# Patient Record
(demographics unavailable — no encounter records)

---

## 2024-10-26 NOTE — HISTORY OF PRESENT ILLNESS
[FreeTextEntry1] : The patient returns stating she is doing better, but still her foot gives her a hard time that she stated.  She states that the injections are helping.  She also states that the right foot is a lithe better.  and it does not hurt.

## 2024-10-26 NOTE — PHYSICAL EXAM
[TextEntry] : On the dorsal lateral aspect of the left foot near the base of the fifth metatarsal was diminished pain, swelling and temperature, although still present.

## 2024-10-26 NOTE — ASSESSMENT
[FreeTextEntry1] : Assessment: Peroneal tendinitis, left foot.  Plan:   After further discussion it was agreed to give the patient an injection to reduce inflammation and associated pain and to improve function.  The area to be injected was wiped thoroughly with sterile alcohol.  Then using a combination of 1.0 cc of Celestone acetate suspension combined with 1cc of Dexamethasone sodium phosphate and 1.0cc of Lidocaine plain, an injection was given on the dorsolateral aspect of the left foot.  Sterile gauze was used for compression and then a sterile Band-Aid was applied to the injection site. The possibility of a flair reaction was discussed with the patient prior to administering the injection.  A compression strapping was applied was applied the foot and ankle for support and to help reduce swelling and pain and improve function.  After further assessment the left foot was placed in approximately 90 degrees to the ankle with the foot in as close to neutral position as possible.  The foot and ankle were prepped to be clean and dry.  Pre tape spray may have been used on the foot and ankle as indicated.  Pre-tape adhesive spray provides an extra sticky, waterproof barrier, that helps tapes and strapping stick better to the skin. Perfect for oily skin, sweaty skin, water sports and extreme environments. It can be a game-changer when strapping challenging areas such as sweaty or wet ankles & feet. I then applied a strapping to the left foot and ankle for support and compression to help relieve pressure and symptoms related to the patient's foot/ankle problem.   The strapping can aid recovery by supporting the muscles, tendons, or ligaments around the affected painful area, as well as improving blood flow to the area. Plus, the strapping will reduce pain and improve the gait, preventing further injury from poor alignment. The strapping also provides proprioceptive feedback. The strapping can reduce the amount of stretching and moving the ligaments do when the patient is weight bearing. This not only gives tissues a better chance to heal, but it also helps prevent further damage. This strapping also minimizes foot pronation, collapse, or flattening which causes over use and irritation to the muscle, tendons, and ligaments in the foot. Often, relief of pain with the strapping is a diagnostic indication for the need of functional orthotic devices. In general, strappings may be used to treat strains, sprains, dislocations, and some fractures. Strapping the foot and ankle provides the external stabilization that stretched ligaments (tissues connecting bone to bone) need while they heal. Most studies show that strapping the foot and ankle decreases the incidence and severity of the affected area.  Besides physical support, strapping can increase biofeedback and increase proprioception, which is a body's ability to know where it is thus aiding in healing and reducing further exacerbation of the pain. The patient was instructed to leave the strapping in place for the next few days to a week, if it was comfortable.  The patient was advised to keep the strapping dry, but leave it on and dry with a hair dryer if it got wet.   The patient was further instructed that if the strapping was uncomfortable or causes any problems it should be removed right away and the office notified.  PTR:  1 month.

## 2024-10-28 NOTE — HISTORY OF PRESENT ILLNESS
[FreeTextEntry1] : Ms. Parsons is a 54 y/o female with PMH of vasovagal syncope (s/p ILR), migraines, HLD, cervical spine fracture. Patient has had a longstanding history of fainting which begun when she was a teenager.

## 2024-10-28 NOTE — CARDIOLOGY SUMMARY
[de-identified] : 8/23/23 TTE: LVEF 61%, moderately increased wall thickness, normal RV function, mild TR

## 2024-11-08 NOTE — CARDIOLOGY SUMMARY
[de-identified] : 11/8/2024 - NSR 68BPM, normal intervals [de-identified] : 8/23/23 TTE: LVEF 61%, moderately increased wall thickness, normal RV function, mild TR

## 2024-11-08 NOTE — CARDIOLOGY SUMMARY
[de-identified] : 11/8/2024 - NSR 68BPM, normal intervals [de-identified] : 8/23/23 TTE: LVEF 61%, moderately increased wall thickness, normal RV function, mild TR

## 2024-11-08 NOTE — HISTORY OF PRESENT ILLNESS
[FreeTextEntry1] : Ms. Parsons is a 54 y/o female with PMH of vasovagal syncope (s/p ILR), migraines, HLD, cervical spine fracture. Patient has had a longstanding history of fainting which begun when she was a teenager.  She underwent a loop recorder implantation in January 2024 ([employer]).  Since her implantation, she states she has not had any recurrent episodes of syncope.  Device prior interrogations reviewed, no evidence of adrian or tachyarrhythmia.  Device interrogated in the office today, again showing no adrian or tachyarrhythmias. She has been otherwise doing well.  She is somewhat bothered by the loop recorder as she feels it when she works out, but is not prohibitive.

## 2024-11-08 NOTE — HISTORY OF PRESENT ILLNESS
[FreeTextEntry1] : Ms. Parsons is a 56 y/o female with PMH of vasovagal syncope (s/p ILR), migraines, HLD, cervical spine fracture. Patient has had a longstanding history of fainting which begun when she was a teenager.  She underwent a loop recorder implantation in January 2024 ([employer]).  Since her implantation, she states she has not had any recurrent episodes of syncope.  Device prior interrogations reviewed, no evidence of adrian or tachyarrhythmia.  Device interrogated in the office today, again showing no adrian or tachyarrhythmias. She has been otherwise doing well.  She is somewhat bothered by the loop recorder as she feels it when she works out, but is not prohibitive.

## 2024-11-08 NOTE — ASSESSMENT
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  She has not had any tachy or bradyarrhythmias since her loop implant however she has not had any recurrent syncope events.  We will continue to monitor her remotely with an in office follow up in 6 months.  She did express that she would like the loop recorder moved after a formal diagnosis is made as it is bothering her.  All questions were answered to her apparent satisfaction.

## 2024-11-20 NOTE — ASSESSMENT
[FreeTextEntry1] : Assessment: Foreign body, glass, left foot.  Plan:   Utilizing a sterile #15 blade, I was able to excise small pieces of glass from both the submetatarsal 4 and left heel region.  I cleansed both areas with normal saline and applied Amerigel and a sterile dressing.  I applied dispersion to the submetatarsal 4 region.  I then told her to start soaking her foot in lukewarm water and Epsom salts, 2 tablespoons per pint for 20 minutes twice per day.  She was advised to dry her feet with a clean towel and then apply a sterile dressing to the area until symptoms and objective findings return to normal.  I advised the patient to notify the office right away if increased redness, swelling, pain, open wounds or discharge were observed.  PTR:  1 week.

## 2024-11-20 NOTE — HISTORY OF PRESENT ILLNESS
[FreeTextEntry1] : Mary Parsons returns stating that she stepped on glass yesterday.  She states that they were working on a ceiling fan in her home and the glass portion fell and broke.  She states that her  tried to remove a piece yesterday, but she does not think that it got all the way out and the foot is hurting her.  She denies any signs of infection.

## 2024-11-20 NOTE — PHYSICAL EXAM
[TextEntry] : On the plantar surface of the left foot plantar to the fourth metatarsal head and the left heel, there are areas of blackened skin.  They are painful to palpation.  No pus, discharge, or erythema is present.

## 2024-11-27 NOTE — HISTORY OF PRESENT ILLNESS
[Right Leg] : right leg [Work related] : work related [Sudden] : sudden [10] : 10 [Radiating] : radiating [Tightness] : tightness [Intermittent] : intermittent [Household chores] : household chores [Meds] : meds [Nothing helps with pain getting better] : Nothing helps with pain getting better [Sitting] : sitting [Standing] : standing [Walking] : walking [Exercising] : exercising [Disabled] : Work status: disabled [de-identified] : Patient Complaint - WC 3/10/2006 R Hip s/p arthroscopy (Dr. Ray) Better w/ ART therapy and going to the gym, hasn't been able to go recently due to her mother being i take Vicodin which is helpful. 7/14/10: Pt still notes pain in her buttocks, groin and greater trochanteric region. Had u/s guided injecti and buttock w/ early relief 11/10/10 Awaiting auth for repeat injection 1/5/11 had trigger injections x 2 w/ good releif. ART 3/2/11: pt presents with cont pain in R hip, no significant changes. Pt cont to use Vicodin. 5/11/11: Pt presents f/u R hip pain, still doing ART which is helpful. Pt still using Vicodin. 7/13/11 just had r glut injection and is feeling better, but still c/o pain. Getting ART. 10/19/11: cont to note pain, ART is still helpful. Vicodin PRN. 3/7/12: f/u R hip pain. Still awaiting authorization for R hip injection (via Dr. Ray). 6/6/12: f/u R hip, notes cont pain in lateral thigh and groin region, especially after working. Had injectio 8/8/12 f/u R hip ART post-poned but restarted. Has f/u w/ Dr. Ray and new MRI 10/3/12 cont P.T. considering hip injection 12/5/12: f/u R hip, completed MRI of R hip, notes cont pain. 3/13/13 f/u R hip. Had f/u w/ Dr. Ray, awaitting auth for injection. 5/8/13: f/u R hip, notes it gave out , causing her to stumble, no increase in pain. Doing ART still. 7/3/13: f/u R hip, notes cont pain. Doing ART, which is helpful. Requesting Vicodin. F/u Dr. Ray 8/21/13: f/u R hip. She continues to have pain. ART helps. 10/02/13: Pt here for f/u right hip. stabel, good and bad reynaga. doing ART and going to gym. requesting re 11/13/13 here for follow up , is scheduled to see Dr Rodríguez for consult, still going to gym and doing AR 1/15/14: here for f/u right hip. continued pain. waiting to see Dr. Rodríguez. doing ART with good relief. 3/12/14: F/u R hip. Doing ART 5/14/14 F/ R hi Di ART Vidi  f i Sh i till iti t  D Rt t HSS 8/20/14 f/u R hip w/clicking 10/1/14 f/u R hip scheduled first of 3 injections 10/10 ART Requ Vicodin r/n 12/10/14: f/u R hip. stable. better after injections. ART. requesting vicodin. 2/4/15 f/u R hip HEP. Caring for family member 4/1/15 f/u R hip hep, ART occas popping 5/27/15: f/u R hip lower back. Intermittent pain. ART with good relief. 7/8/15 f/u R hip PT reauthorized, still going to ART. 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 Had PT/ART yesterday, u patch 10/21/15: f/u R hip. Here to discuss mri results. Continued pain. 12/2/15 f/u R hip HEP. PT not authorized 2/24/16 f/u R hip HEP.. Was better w/ART, aqua 6/8/16 f/u R hip and radiating lbp. Has been caring for mother, unable to get to regular treatments Working l 8/3/16 f/u R hip. Increasing pain requesting injection. Working light duty 9/14/16 f/u R hip Back pain better after last injection ART, light duty 11/9/16: f/u R hip, continued lateral hip pain, doing ART/HEP and going to the gym frequently. Working light  1/25/17: f/u R hip. continued pain. Doing ART. stable with medication. 3/15/17 f/u R hip HEP/gym. Working light duty 5/31/17 f/u L hip/vback. PT/ART no authorized HEP Working light duty 7/5/17: f/u L hip/back. waiting on authorization for heating pad. 9/13/17 f/u R hip HEP/ART Not working Med r/n 12/13/17 f/u R hip/back. HEP/PT ART. Working light duty 1/24/18 f/u R hip/back. PT/ART working light duty. Med request 4/11/18: f/u R hip/back. Continued pain lower back, right hip radiating down right leg. working light duty. 5/30/18 f/u back ,leg Better after last injection 8/29/18: f/u hip and back. continued pain. has concussion and has bad headache. working light duty 11/14/18: f/u hip and back. continued pain. working light duty. 1/9/18 f/u R hip HEP working light duty 3/13/19 f/u R hip Still clicks Not working due to unrelated neck/arm condition Was better w/ ART 5/1/19 f/u R hip. PT/ART on hold due to elbow condition Not working 6/12/19: f/u R hip. PT/ART when she can afford to go. She now c/o left hip pain from an altered gait. 7/31/19: f/u R hip Unable to get to gym/ART Not working 9/25/19 f/u R hip, "pops" No active treatment due to other conditions Not working 11/6/19 f/u R HIP Flector patch helpful but not authorized. Pain mgt 12/18/19: f/u R hip. Continued pain. waiting for auth for flector patch. Pt did not get any relief from Lidod topicals. 2/12/20 f/u R hip PT not auth, HEP 5/6/20: f/u R hip, continued pain to the hip. HEP. Taking diclofenac/Vicodin prn. disabled 7/7/20 f/u R hip/back. Flector patch auth pending Requesting injection 9/23/2020: f/u R hip/back. Continue pain to her back radiating down her R leg. Waiting for auth for mri lum 10/14/20 f/u R hip and back pain 1/27/21: f/u R hip and back pain. Continued hip pain. 3/24/21 f/u R hip clicking and R sided SI pain. HEP Not working due to arm and neck condition 6/23/21 f/u R hip click and r sided lbp HEP Not working 8/25/21 f/u R sided lbp and R hip "clicking" HEP Not working 10/27/21 f/u R hip and R sided lbp HEP Not working (neck and elbow unrelated) 1/5/22 f/u R lbp, R hip radiating to groin 2/23/22 F/U r BACK/HIP RADIATING PAIN. lAST INJECTION HELPED GLUTEAL PAIN HEP/Gym Not working R 5/4/22  f/u lbp/R hip  Voltaren Gel/Vicodin  ? muscle relaxant  Not working 6/8/22  f/u R hip, knee, low back, and R ankle  HEP/Gym 8/3/22  f/u R hip R knee R ankle and low back.  HEP Requ Voltaren Gel and Vicodin  Not working 10/26/22  f/u R hip/knee/back/ankle  HEP/Gym  Med r/n 2/22/23: f/u R hip/knee/back/ankle.   hep/gym.   med r/n 3/29/23  f/u R hip/knee/back/ankle R buttock to groin radiation HEP  Pain MGT  Massage therapy 6/14/23  f/u R hip/back/knee/ankle  Pain Mgt  Gym program  Major issues with pain mgt 9/20/23  f/u R hip/back HEP  [facility] request  Not working 12/13/23  f/u R hip/back  HEP/Gym beneficial  Not working 3/13/24  f/u R hip/back  Recent appendectomy  Restarted Gym 5/22/24  f/u R hip/back.  Radiating R hip/buttovk/groin pain Med auth requested (x2)  Gym helpful 7/24/24  f/u R hip/back  Med r/n  HEP/Gym  Not working 9/25/24  f/u R hip and back.  Having issues with Pain Rx timing 11/27/24: f/u R hip and back. better w/ gym   [] : no [FreeTextEntry1] : Rt Hip [FreeTextEntry3] : 3-10-06 [FreeTextEntry7] : lower back [FreeTextEntry9] : gym exercises [de-identified] : over use [de-identified] : not working

## 2024-11-27 NOTE — PHYSICAL EXAM
[NL (90)] : forward flexion 90 degrees [NL (30)] : extension 30 degrees [NL (35)] : adduction 35 degrees [NL (45)] : abduction 45 degrees [5___] : adduction 5[unfilled]/5 [Right] : right knee [NL (0)] : extension 0 degrees [FreeTextEntry9] : audible/palpable "pop" [] : no extensor lag [TWNoteComboBox7] : flexion 115 degrees

## 2024-11-27 NOTE — REASON FOR VISIT
Linear
[FreeTextEntry2] : F/U WC Rt Hip 6/04\par  WC 5/1/05 Low back, R knee, R hip, R ankle\par  3/10/06 Low back, R knee, R hip, R ankle

## 2024-11-27 NOTE — DISCUSSION/SUMMARY
[de-identified] : Continue HEP/Gym  Massage therapy  Continue medications as prescribed Pain Management

## 2024-11-29 NOTE — PHYSICAL EXAM
[TextEntry] : Greatly diminished pain on the posterior lateral aspect of the left foot and on the heel of the right foot.    Pain was no longer elicited upon palpation at this time with diminished temperature on the dorsal lateral aspect of the left foot    The right hallux was in valgus rotation with mild hyperkeratotic tissue present.

## 2024-11-29 NOTE — HISTORY OF PRESENT ILLNESS
[FreeTextEntry1] : The patient returns stating that the pain is 7/10 and was bad at 10/10.   She said she has it on both sides of both heels.    She also complains of a crease in the right great toe with some build up there.    She admits that she walks barefoot in the house.

## 2024-11-29 NOTE — ASSESSMENT
[FreeTextEntry1] : Assessment: Peroneal tendinitis, left foot. Plantar fasciitis, bilateral. Hallux valgus, right foot.  Plan:   I performed aseptic scalpel debridement of the one painful lesion on the right great toe with a sterile #15 blade on a sterile #3 handle.  The patient was given stretching exercises to be performed for the Achilles tendon and plantar fascial regions of both feet for the heel pain.  The patient was instructed to perform the exercises gradually at first and then more aggressively twice per day, for 2 minutes per exercise. The patient was advised to gradually increase weight bearing activities for the next 2-3 weeks.  The patient was instructed as to proper shoe gear.  I recommended a brand name such as New Balance, Avia, Saucony or Brasher and advised patient to get an aerobic, cross  or running shoe. I explained that the heel counter should be rigid, the shoe/sneaker should not bend easily or be flimsy and should only bend by the ball of the foot.  The sides of the sneakers or shoes should be rigid especially in the rear foot area.  The patient was advised not to walk barefoot, wear slippers, flip-flops or open back shoes.   She is to call with any problems or questions and  return in six weeks.

## 2025-01-16 NOTE — PHYSICAL EXAM
[TextEntry] : On the plantar aspect of the left heel was increased temperature and pain.   Limited dorsiflexion was noted on the left foot compared to the right.    On the dorsolateral aspect of the left foot, swelling, pain and temperature all greatly diminished.   The right first and second toes exhibited peeling skin, mild maceration.    The second toe was contracted.

## 2025-01-16 NOTE — HISTORY OF PRESENT ILLNESS
[FreeTextEntry1] : The patient returns for continued treatment for the pain in her left heel.   She states that last night the upper arch hurt on the left foot and the pain was 10/10.  She states that it does not happen all the time.    Right now, she states that the pain is 4/10.   Right foot, she stated that she used the medicated corn pad between the big toe and the second toe because there was a corn there and now she has peeling skin.

## 2025-01-16 NOTE — ASSESSMENT
[FreeTextEntry1] : Assessment: Plantar fasciitis, left foot. Peroneal tendinitis, left foot. Hammertoe deformity, left second toe.   Plan: The patient was given a prescription for Meloxicam 15-mg, to treat the inflammation and reduce pain & swelling and help restore normal function. The patient was instructed to take 1 tablet per day after her major meal for 2 weeks, so that it would be less offensive on the stomach.  The patient denied being allergic to the medication and was advised to call the office if they experienced any side effects.  The patient was advised strongly not to use the medicated corn pads.  The patient was dispensed a gel toecap for the right hallux to wear at all times other than bathing and sleeping, to help straighten the toe somewhat and cushion it and separate it from the adjacent toe to relieve pressure and reduce and help prevent pain and inflammation.   The patient was advised to wear shoes with a high toe box and a comfortable width to reduce pressure on the toes.  She is to call with any problems or questions and  return in two weeks.

## 2025-01-29 NOTE — REASON FOR VISIT
[FreeTextEntry2] : F/U WC Rt Hip 6/04\par  WC 5/1/05 Low back, R knee, R hip, R ankle\par  3/10/06 Low back, R knee, R hip, R ankle

## 2025-01-29 NOTE — HISTORY OF PRESENT ILLNESS
[Right Leg] : right leg [Work related] : work related [Sudden] : sudden [Radiating] : radiating [Tightness] : tightness [Intermittent] : intermittent [Household chores] : household chores [Meds] : meds [Nothing helps with pain getting better] : Nothing helps with pain getting better [Sitting] : sitting [Standing] : standing [Walking] : walking [Exercising] : exercising [Disabled] : Work status: disabled [8] : 8 [de-identified] : Patient Complaint - WC 3/10/2006 R Hip s/p arthroscopy (Dr. Ray) Better w/ ART therapy and going to the gym, hasn't been able to go recently due to her mother being i take Vicodin which is helpful. 7/14/10: Pt still notes pain in her buttocks, groin and greater trochanteric region. Had u/s guided injecti and buttock w/ early relief 11/10/10 Awaiting auth for repeat injection 1/5/11 had trigger injections x 2 w/ good releif. ART 3/2/11: pt presents with cont pain in R hip, no significant changes. Pt cont to use Vicodin. 5/11/11: Pt presents f/u R hip pain, still doing ART which is helpful. Pt still using Vicodin. 7/13/11 just had r glut injection and is feeling better, but still c/o pain. Getting ART. 10/19/11: cont to note pain, ART is still helpful. Vicodin PRN. 3/7/12: f/u R hip pain. Still awaiting authorization for R hip injection (via Dr. Ray). 6/6/12: f/u R hip, notes cont pain in lateral thigh and groin region, especially after working. Had injectio 8/8/12 f/u R hip ART post-poned but restarted. Has f/u w/ Dr. Ray and new MRI 10/3/12 cont P.T. considering hip injection 12/5/12: f/u R hip, completed MRI of R hip, notes cont pain. 3/13/13 f/u R hip. Had f/u w/ Dr. Ray, awaitting auth for injection. 5/8/13: f/u R hip, notes it gave out , causing her to stumble, no increase in pain. Doing ART still. 7/3/13: f/u R hip, notes cont pain. Doing ART, which is helpful. Requesting Vicodin. F/u Dr. Ray 8/21/13: f/u R hip. She continues to have pain. ART helps. 10/02/13: Pt here for f/u right hip. stabel, good and bad reynaga. doing ART and going to gym. requesting re 11/13/13 here for follow up , is scheduled to see Dr Rodríguez for consult, still going to gym and doing AR 1/15/14: here for f/u right hip. continued pain. waiting to see Dr. Rodríguez. doing ART with good relief. 3/12/14: F/u R hip. Doing ART 5/14/14 F/ R hi Di ART Vidi  f i Sh i till iti t  D Rt t HSS 8/20/14 f/u R hip w/clicking 10/1/14 f/u R hip scheduled first of 3 injections 10/10 ART Requ Vicodin r/n 12/10/14: f/u R hip. stable. better after injections. ART. requesting vicodin. 2/4/15 f/u R hip HEP. Caring for family member 4/1/15 f/u R hip hep, ART occas popping 5/27/15: f/u R hip lower back. Intermittent pain. ART with good relief. 7/8/15 f/u R hip PT reauthorized, still going to ART. 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 Had PT/ART yesterday, u patch 10/21/15: f/u R hip. Here to discuss mri results. Continued pain. 12/2/15 f/u R hip HEP. PT not authorized 2/24/16 f/u R hip HEP.. Was better w/ART, aqua 6/8/16 f/u R hip and radiating lbp. Has been caring for mother, unable to get to regular treatments Working l 8/3/16 f/u R hip. Increasing pain requesting injection. Working light duty 9/14/16 f/u R hip Back pain better after last injection ART, light duty 11/9/16: f/u R hip, continued lateral hip pain, doing ART/HEP and going to the gym frequently. Working light  1/25/17: f/u R hip. continued pain. Doing ART. stable with medication. 3/15/17 f/u R hip HEP/gym. Working light duty 5/31/17 f/u L hip/vback. PT/ART no authorized HEP Working light duty 7/5/17: f/u L hip/back. waiting on authorization for heating pad. 9/13/17 f/u R hip HEP/ART Not working Med r/n 12/13/17 f/u R hip/back. HEP/PT ART. Working light duty 1/24/18 f/u R hip/back. PT/ART working light duty. Med request 4/11/18: f/u R hip/back. Continued pain lower back, right hip radiating down right leg. working light duty. 5/30/18 f/u back ,leg Better after last injection 8/29/18: f/u hip and back. continued pain. has concussion and has bad headache. working light duty 11/14/18: f/u hip and back. continued pain. working light duty. 1/9/18 f/u R hip HEP working light duty 3/13/19 f/u R hip Still clicks Not working due to unrelated neck/arm condition Was better w/ ART 5/1/19 f/u R hip. PT/ART on hold due to elbow condition Not working 6/12/19: f/u R hip. PT/ART when she can afford to go. She now c/o left hip pain from an altered gait. 7/31/19: f/u R hip Unable to get to gym/ART Not working 9/25/19 f/u R hip, "pops" No active treatment due to other conditions Not working 11/6/19 f/u R HIP Flector patch helpful but not authorized. Pain mgt 12/18/19: f/u R hip. Continued pain. waiting for auth for flector patch. Pt did not get any relief from Lidod topicals. 2/12/20 f/u R hip PT not auth, HEP 5/6/20: f/u R hip, continued pain to the hip. HEP. Taking diclofenac/Vicodin prn. disabled 7/7/20 f/u R hip/back. Flector patch auth pending Requesting injection 9/23/2020: f/u R hip/back. Continue pain to her back radiating down her R leg. Waiting for auth for mri lum 10/14/20 f/u R hip and back pain 1/27/21: f/u R hip and back pain. Continued hip pain. 3/24/21 f/u R hip clicking and R sided SI pain. HEP Not working due to arm and neck condition 6/23/21 f/u R hip click and r sided lbp HEP Not working 8/25/21 f/u R sided lbp and R hip "clicking" HEP Not working 10/27/21 f/u R hip and R sided lbp HEP Not working (neck and elbow unrelated) 1/5/22 f/u R lbp, R hip radiating to groin 2/23/22 F/U r BACK/HIP RADIATING PAIN. lAST INJECTION HELPED GLUTEAL PAIN HEP/Gym Not working R 5/4/22  f/u lbp/R hip  Voltaren Gel/Vicodin  ? muscle relaxant  Not working 6/8/22  f/u R hip, knee, low back, and R ankle  HEP/Gym 8/3/22  f/u R hip R knee R ankle and low back.  HEP Requ Voltaren Gel and Vicodin  Not working 10/26/22  f/u R hip/knee/back/ankle  HEP/Gym  Med r/n 2/22/23: f/u R hip/knee/back/ankle.   hep/gym.   med r/n 3/29/23  f/u R hip/knee/back/ankle R buttock to groin radiation HEP  Pain MGT  Massage therapy 6/14/23  f/u R hip/back/knee/ankle  Pain Mgt  Gym program  Major issues with pain mgt 9/20/23  f/u R hip/back HEP  MNeds request  Not working 12/13/23  f/u R hip/back  HEP/Gym beneficial  Not working 3/13/24  f/u R hip/back  Recent appendectomy  Restarted Gym 5/22/24  f/u R hip/back.  Radiating R hip/buttovk/groin pain Med auth requested (x2)  Gym helpful 7/24/24  f/u R hip/back  Med r/n  HEP/Gym  Not working 9/25/24  f/u R hip and back.  Having issues with Pain Rx timing 11/27/24: f/u R hip and back. better w/ gym   1/29/25  f/u R hip and lbp.  Better w/ gym and PT [] : no [FreeTextEntry1] : Rt Hip [FreeTextEntry3] : 3-10-06 [FreeTextEntry7] : lower back [FreeTextEntry9] : gym exercises [de-identified] : over use [de-identified] : not working

## 2025-01-29 NOTE — PHYSICAL EXAM
[NL (90)] : forward flexion 90 degrees [Right] : right hip [NL (30)] : extension 30 degrees [NL (35)] : adduction 35 degrees [NL (45)] : abduction 45 degrees [5___] : adduction 5[unfilled]/5 [NL (0)] : extension 0 degrees [FreeTextEntry9] : audible/palpable "pop" [] : no extensor lag [TWNoteComboBox7] : flexion 115 degrees

## 2025-01-29 NOTE — DISCUSSION/SUMMARY
[de-identified] : Continue HEP Request auth for PT  Continue medications as prescribed Pain Management

## 2025-02-06 NOTE — HISTORY OF PRESENT ILLNESS
[FreeTextEntry1] : The patient returns stating that she is improving.  When questioned she stated that She is doing the stretching.    I questioned if she took the meloxicam, and she stated that her gastroenterologist told her not to take it because she has acid reflux.

## 2025-02-06 NOTE — PHYSICAL EXAM
[TextEntry] : The plantar aspect of the left heel was not as warm to the touch.   It was not as painful upon palpation.   There was improved dorsiflexion.

## 2025-02-06 NOTE — ASSESSMENT
[FreeTextEntry1] : Assessment: Plantar fasciitis, left foot.  Plan:   After further discussion it was agreed to give the patient an injection to reduce inflammation and associated pain and to improve function.  The area to be injected was wiped thoroughly with sterile alcohol.  Then using a combination of 1.0 cc of Celestone acetate suspension combined with 1cc of Dexamethasone sodium phosphate and 1.0cc of Lidocaine plain, an injection was given into the plantar medial aspect of the left heel.  Sterile gauze was used for compression and then a sterile Band-Aid was applied to the injection site. The possibility of a flair reaction was discussed with the patient prior to administering the injection.  After further assessment the left foot and ankle was placed in approximately 90 degrees to the ankle with the foot in as close to neutral position as possible.  The foot and ankle were prepped to be clean and dry.  Pre tape spray may have been used on the foot and ankle as indicated.  Pre-tape adhesive spray provides an extra sticky, waterproof barrier, that helps tapes and strapping stick better to the skin. Perfect for oily skin, sweaty skin, water sports and extreme environments. It can be a game-changer when strapping challenging areas such as sweaty or wet ankles & feet. I then applied a strapping to the left foot and ankle for support and compression to help relieve pressure and symptoms related to the patient's foot/ankle problem.   The strapping can aid recovery by supporting the muscles, tendons, or ligaments around the affected painful area, as well as improving blood flow to the area. Plus, the strapping will reduce pain and improve the gait, preventing further injury from poor alignment. The strapping also provides proprioceptive feedback. The strapping can reduce the amount of stretching and moving the ligaments do when the patient is weight bearing. This not only gives tissues a better chance to heal, but it also helps prevent further damage. This strapping also minimizes foot pronation, collapse, or flattening which causes over use and irritation to the muscle, tendons, and ligaments in the foot. Often, relief of pain with the strapping is a diagnostic indication for the need of functional orthotic devices. In general, strappings may be used to treat strains, sprains, dislocations, and some fractures. Strapping the foot and ankle provides the external stabilization that stretched ligaments (tissues connecting bone to bone) need while they heal. Most studies show that strapping the foot and ankle decreases the incidence and severity of the affected area.  Besides physical support, strapping can increase biofeedback and increase proprioception, which is a body's ability to know where it is thus aiding in healing and reducing further exacerbation of the pain. The patient was instructed to leave the strapping in place for the next few days to a week, if it was comfortable.  The patient was advised to keep the strapping dry, but leave it on and dry with a hair dryer if it got wet.   The patient was further instructed that if the strapping was uncomfortable or causes any problems it should be removed right away and the office notified.  The blister on the right second toe has resolved.    She is to continue to stretch twice a day, limit weightbearing activities for the next 2 weeks, which she did not do.   In fact, she was on the treadmill, but she was not running, she was walking.   I explained the importance of rest and she is to  return in two weeks.

## 2025-03-26 NOTE — HISTORY OF PRESENT ILLNESS
[Right Leg] : right leg [Work related] : work related [Sudden] : sudden [8] : 8 [Radiating] : radiating [Tightness] : tightness [Intermittent] : intermittent [Household chores] : household chores [Meds] : meds [Nothing helps with pain getting better] : Nothing helps with pain getting better [Sitting] : sitting [Standing] : standing [Walking] : walking [Exercising] : exercising [Disabled] : Work status: disabled [de-identified] : Patient Complaint - WC 3/10/2006 R Hip s/p arthroscopy (Dr. Ray) Better w/ ART therapy and going to the gym, hasn't been able to go recently due to her mother being i take Vicodin which is helpful. 7/14/10: Pt still notes pain in her buttocks, groin and greater trochanteric region. Had u/s guided injecti and buttock w/ early relief 11/10/10 Awaiting auth for repeat injection 1/5/11 had trigger injections x 2 w/ good releif. ART 3/2/11: pt presents with cont pain in R hip, no significant changes. Pt cont to use Vicodin. 5/11/11: Pt presents f/u R hip pain, still doing ART which is helpful. Pt still using Vicodin. 7/13/11 just had r glut injection and is feeling better, but still c/o pain. Getting ART. 10/19/11: cont to note pain, ART is still helpful. Vicodin PRN. 3/7/12: f/u R hip pain. Still awaiting authorization for R hip injection (via Dr. Ray). 6/6/12: f/u R hip, notes cont pain in lateral thigh and groin region, especially after working. Had injectio 8/8/12 f/u R hip ART post-poned but restarted. Has f/u w/ Dr. Ray and new MRI 10/3/12 cont P.T. considering hip injection 12/5/12: f/u R hip, completed MRI of R hip, notes cont pain. 3/13/13 f/u R hip. Had f/u w/ Dr. Ray, awaitting auth for injection. 5/8/13: f/u R hip, notes it gave out , causing her to stumble, no increase in pain. Doing ART still. 7/3/13: f/u R hip, notes cont pain. Doing ART, which is helpful. Requesting Vicodin. F/u Dr. Ray 8/21/13: f/u R hip. She continues to have pain. ART helps. 10/02/13: Pt here for f/u right hip. stabel, good and bad reynaga. doing ART and going to gym. requesting re 11/13/13 here for follow up , is scheduled to see Dr Rodríguez for consult, still going to gym and doing AR 1/15/14: here for f/u right hip. continued pain. waiting to see Dr. Rodríguez. doing ART with good relief. 3/12/14: F/u R hip. Doing ART 5/14/14 F/ R hi Di ART Vidi  f i Sh i till iti t  D Rt t HSS 8/20/14 f/u R hip w/clicking 10/1/14 f/u R hip scheduled first of 3 injections 10/10 ART Requ Vicodin r/n 12/10/14: f/u R hip. stable. better after injections. ART. requesting vicodin. 2/4/15 f/u R hip HEP. Caring for family member 4/1/15 f/u R hip hep, ART occas popping 5/27/15: f/u R hip lower back. Intermittent pain. ART with good relief. 7/8/15 f/u R hip PT reauthorized, still going to ART. 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 Had PT/ART yesterday, u patch 10/21/15: f/u R hip. Here to discuss mri results. Continued pain. 12/2/15 f/u R hip HEP. PT not authorized 2/24/16 f/u R hip HEP.. Was better w/ART, aqua 6/8/16 f/u R hip and radiating lbp. Has been caring for mother, unable to get to regular treatments Working l 8/3/16 f/u R hip. Increasing pain requesting injection. Working light duty 9/14/16 f/u R hip Back pain better after last injection ART, light duty 11/9/16: f/u R hip, continued lateral hip pain, doing ART/HEP and going to the gym frequently. Working light  1/25/17: f/u R hip. continued pain. Doing ART. stable with medication. 3/15/17 f/u R hip HEP/gym. Working light duty 5/31/17 f/u L hip/vback. PT/ART no authorized HEP Working light duty 7/5/17: f/u L hip/back. waiting on authorization for heating pad. 9/13/17 f/u R hip HEP/ART Not working Med r/n 12/13/17 f/u R hip/back. HEP/PT ART. Working light duty 1/24/18 f/u R hip/back. PT/ART working light duty. Med request 4/11/18: f/u R hip/back. Continued pain lower back, right hip radiating down right leg. working light duty. 5/30/18 f/u back ,leg Better after last injection 8/29/18: f/u hip and back. continued pain. has concussion and has bad headache. working light duty 11/14/18: f/u hip and back. continued pain. working light duty. 1/9/18 f/u R hip HEP working light duty 3/13/19 f/u R hip Still clicks Not working due to unrelated neck/arm condition Was better w/ ART 5/1/19 f/u R hip. PT/ART on hold due to elbow condition Not working 6/12/19: f/u R hip. PT/ART when she can afford to go. She now c/o left hip pain from an altered gait. 7/31/19: f/u R hip Unable to get to gym/ART Not working 9/25/19 f/u R hip, "pops" No active treatment due to other conditions Not working 11/6/19 f/u R HIP Flector patch helpful but not authorized. Pain mgt 12/18/19: f/u R hip. Continued pain. waiting for auth for flector patch. Pt did not get any relief from Lidod topicals. 2/12/20 f/u R hip PT not auth, HEP 5/6/20: f/u R hip, continued pain to the hip. HEP. Taking diclofenac/Vicodin prn. disabled 7/7/20 f/u R hip/back. Flector patch auth pending Requesting injection 9/23/2020: f/u R hip/back. Continue pain to her back radiating down her R leg. Waiting for auth for mri lum 10/14/20 f/u R hip and back pain 1/27/21: f/u R hip and back pain. Continued hip pain. 3/24/21 f/u R hip clicking and R sided SI pain. HEP Not working due to arm and neck condition 6/23/21 f/u R hip click and r sided lbp HEP Not working 8/25/21 f/u R sided lbp and R hip "clicking" HEP Not working 10/27/21 f/u R hip and R sided lbp HEP Not working (neck and elbow unrelated) 1/5/22 f/u R lbp, R hip radiating to groin 2/23/22 F/U r BACK/HIP RADIATING PAIN. lAST INJECTION HELPED GLUTEAL PAIN HEP/Gym Not working R 5/4/22  f/u lbp/R hip  Voltaren Gel/Vicodin  ? muscle relaxant  Not working 6/8/22  f/u R hip, knee, low back, and R ankle  HEP/Gym 8/3/22  f/u R hip R knee R ankle and low back.  HEP Requ Voltaren Gel and Vicodin  Not working 10/26/22  f/u R hip/knee/back/ankle  HEP/Gym  Med r/n 2/22/23: f/u R hip/knee/back/ankle.   hep/gym.   med r/n 3/29/23  f/u R hip/knee/back/ankle R buttock to groin radiation HEP  Pain MGT  Massage therapy 6/14/23  f/u R hip/back/knee/ankle  Pain Mgt  Gym program  Major issues with pain mgt 9/20/23  f/u R hip/back HEP  MNeds request  Not working 12/13/23  f/u R hip/back  HEP/Gym beneficial  Not working 3/13/24  f/u R hip/back  Recent appendectomy  Restarted Gym 5/22/24  f/u R hip/back.  Radiating R hip/buttovk/groin pain Med auth requested (x2)  Gym helpful 7/24/24  f/u R hip/back  Med r/n  HEP/Gym  Not working 9/25/24  f/u R hip and back.  Having issues with Pain Rx timing 11/27/24: f/u R hip and back. better w/ gym   1/29/25  f/u R hip and lbp.  Better w/ gym and PT 3/26/25  f/u R hip and lumbar spine.  Home/Gym  PT auth pending  Requ Meds [] : no [FreeTextEntry1] : Rt Hip [FreeTextEntry3] : 3-10-06 [FreeTextEntry7] : lower back [FreeTextEntry9] : gym exercises [de-identified] : over use [de-identified] : not working

## 2025-04-01 NOTE — HISTORY OF PRESENT ILLNESS
[Sudden] : sudden [Localized] : localized [de-identified] : Patient states she had the optimum service man at her house when he left the drill facing up with the drillbit. She squatted down and the drillbit went into her left thigh on 3/26/25. She went to [facility] and was treated with a tetanus shot and Cephalexin. Then a few days later she was seen at  and treated with Doxcycline.  [] : no [FreeTextEntry3] : 3-26-25 [FreeTextEntry5] : pt was up and downstairs 4 times to clear out a closet and  left drill on the floor  [de-identified] : over use [de-identified] : JERAD Carmen [de-identified] : darby Carmen

## 2025-04-01 NOTE — DISCUSSION/SUMMARY
[de-identified] : General Dx Discussion The patient was advised of the diagnosis. The natural history of the pathology was explained in full to the patient in layman's terms. All questions were answered. The risks and benefits of surgical and non-surgical treatment alternatives were explained in full to the patient.  Case Discussed. Advised to continue with antibiotics.  Wound Care Discussed. Will get an mri for further evaluation. f/u after mri.   Entered by Linda DEMARCO acting as a scribe. Instructions: Dr. Johnson- The documentation recorded by the scribe accurately reflects the service I personally performed and the decisions made by me.

## 2025-04-01 NOTE — DISCUSSION/SUMMARY
[de-identified] : General Dx Discussion The patient was advised of the diagnosis. The natural history of the pathology was explained in full to the patient in layman's terms. All questions were answered. The risks and benefits of surgical and non-surgical treatment alternatives were explained in full to the patient.  Case Discussed. Advised to continue with antibiotics.  Wound Care Discussed. Will get an mri for further evaluation. f/u after mri.   Entered by Linda DEMARCO acting as a scribe. Instructions: Dr. Johnson- The documentation recorded by the scribe accurately reflects the service I personally performed and the decisions made by me.

## 2025-04-01 NOTE — PHYSICAL EXAM
[Left] : left hip [FreeTextEntry3] : puncture wound proximal mid thigh. no active drainage. no signs of infection.

## 2025-04-01 NOTE — HISTORY OF PRESENT ILLNESS
[Sudden] : sudden [Localized] : localized [de-identified] : Patient states she had the optimum service man at her house when he left the drill facing up with the drillbit. She squatted down and the drillbit went into her left thigh on 3/26/25. She went to [facility] and was treated with a tetanus shot and Cephalexin. Then a few days later she was seen at  and treated with Doxcycline.  [] : no [FreeTextEntry3] : 3-26-25 [FreeTextEntry5] : pt was up and downstairs 4 times to clear out a closet and  left drill on the floor  [de-identified] : over use [de-identified] : JERAD Carmen [de-identified] : darby Carmen

## 2025-04-07 NOTE — DISCUSSION/SUMMARY
[de-identified] : The patient was advised of the diagnosis.  The natural history of the pathology was explained in full to the patient in layman's terms. All questions were answered.  The risks and benefits of surgical and non-surgical treatment alternatives were explained in full to the patient.

## 2025-04-07 NOTE — ASSESSMENT
[FreeTextEntry1] : Previous doc: She has had pain for many years but not sig OA on Xray - had Labral surgery in 2006 but not sure by who or what was done - Alot of lateral pain but I am also concerned of sig groin pain on exam - I will start PT but get MRA to eval joint and labrum - we did discuss possible options of surgery and conservative tx and she is unlikely a candidate for HARRY at this time 3/29/21 Sh has sig pain mostly lateral and down the thigh - MRA show no sig OA dn some labral scaring but no discrete tear - has some any capsular opening as well that could be contributing - I feel she may benefit form labral debridement and capsular closure but 50/50 chance at best - suggest cont conservative tx if she can tolerate it - we will try bursa inj today 8/16/21: Still with pain - she is trying to avoid surgery at this point as the results are uncertain. She does not have enough OA for HARRY at this time. She may in the future need further hip intervention if pain or OA worsen however at this time recc continuing with conservative care. 9/19/22: Exam and XR unchanged but has persistant pain but is not ready for surgery at this point. Will follow up in 3 months or sooner if pain gets worse. 4/17/23: Lateral and groin pain. No change in XRs today. Has pain but not severe enough for surgery at this point. Cont with HEP and return for repeat XR in 6 months. 12/18/23: Pt with troch bursitis, has some any capsular opening, very mild OA- discussed not enough for HARRY. Pt is well informed and would like to try diagnostic [place] hip inj with Dr. Escobedo due to persistent lateral and groin pain. Does look like labral injury, poss capsular injury. Follow up after injection.  Not sure how to proceed may need repeat arthroscopy possible labral reconstruction versus HARRY but will reevaluate after injection 1/15/24: Pt with troch bursitis, very mild OA.  I do believe a lot of her pain is originating from the hip joint.  Some sig relief with injection but still with pain that is tolerable. Discussed at this point her only realistic options are labral reconstruction vs HARRY- however she has min OA and we are both in agreement to hold off on surgical intervention for now. Will cont to monitor every 3 months. Doing HEP with some relief. If condition worses will discuss HARRY in the future. F/up 3 months 4/15/24: Overall no change - has pain but tolerating with HEP, will cont with this for now.  Has episodes of pain and takes vicodin for this which she gets from another surgeon. 8/19/24: Continued symptoms. On vicodin from Dr. Correa. Will obtain new MRI to re eval hip to see if there has been any progression of her OA or labral tear. Has pain on treadmill but oftentimes it is worse at the end of day. F/up after MRI  4/7/25: MRI reviewed- findings discussed.  Had mild OA with labral degen.  Pain daily but tolerating it for now - cont activity as tolerated, home exercises, return for reeval in 3 months.

## 2025-04-07 NOTE — DATA REVIEWED
[MRI] : MRI [Left] : left [I independently reviewed and interpreted images and report] : I independently reviewed and interpreted images and report [FreeTextEntry1] : MRI right hip Aug 2024: Degen labrum, mild OA, troch bursitis

## 2025-04-07 NOTE — HISTORY OF PRESENT ILLNESS
[de-identified] : 4/7/25: Had MRI right hip done after last visit in Aug 2024 but has not been able to follow up since due to other medical issues.  Cont pain daily, worse at end of the day.  Prev Doc: Rt hip in March 2006 - she worked at [employer] - she has had mult injury on this hip form other falls that are all connected to this case - her most recent injury 3/06 she landed on her feet but felt strong pressure into the hip as all of her weight was on the rt leg - she worked though all the injuries - In 2008 had Labral repair in [place] pres. she had relief but always with some pain was told she would need a HARRY - still has similar pain but worse- lateral pain and some groin pain and into the thigh - has done PT but not for some time doing Gym on her own though WC doing some ART - using Vicodin for the hip pain - she has an MRI from 2018 - - pain slowly getting worse in the hip - she is disabled from her neck and weakness in her arm with combination of her hip as well - she can preform ADLs but feels she has pain with these activities - she has not been back to the Gym since 2018 her last injury doing HEP 3/29 cont symptoms MRA for review - 8/16/21: Cont groin and lateral hip pain. Bursa inj helped for a few days. 9/19/22: Continued groin and lateral pain as well as buttocks tenderness.  4/17/23: Cont pain in right hip, no change since last visit.  Weening off vicodin. 12/18/23: Here to f/up RT hip. Continued pain in lateral hip and groin. Taking vicodin and using diclofenac gel for pain. Uses cane only for long distances.  1/15/24: Pt with RT hip troch bursitis, mild OA. Pain laterally and in the groin. Had hip inj with Dr. Escobedo on 1/2/24 with good relief but still some pain everyday. Taking vicodin with good relief- also helps her to sleep.  4/15/24: No change, had ermegency appendectomy right after last visit, recovered from this and is now started to go back to gym/HEP 3 weeks ago. 8/19/24: Pt with RT hip troch bursitis, mild OA. Continued symptoms- pain in the groin and laterally. Also has lower back pain. Ambulates with cane occasionally for long distances. Does HEP frequently and goes to the gym. OOW. On vicodin from Dr. Correa.

## 2025-04-07 NOTE — WORK
[Total (100%)] : total (100%) [Does not reveal pre-existing condition(s) that may affect treatment/prognosis] : does not reveal pre-existing condition(s) that may affect treatment/prognosis [Cannot return to work because ________] : cannot return to work because [unfilled] [N/A] : : Not Applicable [Patient] : patient [I provided the services listed above] :  I provided the services listed above. [FreeTextEntry1] : fair

## 2025-04-07 NOTE — IMAGING
[de-identified] : Right Hip/Thigh:  groin tenderness, greater trochanter tenderness and buttock tenderness.  limited internal rotation.  Impingement test is positive and groin pain with resisted straight leg raise.  Patient ambulates without assistive device and mildly antalgic gait.

## 2025-04-07 NOTE — HISTORY OF PRESENT ILLNESS
[de-identified] : 4/7/25: Had MRI right hip done after last visit in Aug 2024 but has not been able to follow up since due to other medical issues.  Cont pain daily, worse at end of the day.  Prev Doc: Rt hip in March 2006 - she worked at [employer] - she has had mult injury on this hip form other falls that are all connected to this case - her most recent injury 3/06 she landed on her feet but felt strong pressure into the hip as all of her weight was on the rt leg - she worked though all the injuries - In 2008 had Labral repair in [place] pres. she had relief but always with some pain was told she would need a HARRY - still has similar pain but worse- lateral pain and some groin pain and into the thigh - has done PT but not for some time doing Gym on her own though WC doing some ART - using Vicodin for the hip pain - she has an MRI from 2018 - - pain slowly getting worse in the hip - she is disabled from her neck and weakness in her arm with combination of her hip as well - she can preform ADLs but feels she has pain with these activities - she has not been back to the Gym since 2018 her last injury doing HEP 3/29 cont symptoms MRA for review - 8/16/21: Cont groin and lateral hip pain. Bursa inj helped for a few days. 9/19/22: Continued groin and lateral pain as well as buttocks tenderness.  4/17/23: Cont pain in right hip, no change since last visit.  Weening off vicodin. 12/18/23: Here to f/up RT hip. Continued pain in lateral hip and groin. Taking vicodin and using diclofenac gel for pain. Uses cane only for long distances.  1/15/24: Pt with RT hip troch bursitis, mild OA. Pain laterally and in the groin. Had hip inj with Dr. Escobedo on 1/2/24 with good relief but still some pain everyday. Taking vicodin with good relief- also helps her to sleep.  4/15/24: No change, had ermegency appendectomy right after last visit, recovered from this and is now started to go back to gym/HEP 3 weeks ago. 8/19/24: Pt with RT hip troch bursitis, mild OA. Continued symptoms- pain in the groin and laterally. Also has lower back pain. Ambulates with cane occasionally for long distances. Does HEP frequently and goes to the gym. OOW. On vicodin from Dr. Correa.

## 2025-04-07 NOTE — IMAGING
[de-identified] : Right Hip/Thigh:  groin tenderness, greater trochanter tenderness and buttock tenderness.  limited internal rotation.  Impingement test is positive and groin pain with resisted straight leg raise.  Patient ambulates without assistive device and mildly antalgic gait.

## 2025-04-07 NOTE — DISCUSSION/SUMMARY
[de-identified] : The patient was advised of the diagnosis.  The natural history of the pathology was explained in full to the patient in layman's terms. All questions were answered.  The risks and benefits of surgical and non-surgical treatment alternatives were explained in full to the patient.

## 2025-04-22 NOTE — PHYSICAL EXAM
[Left] : left hip [FreeTextEntry3] : puncture wound proximal mid thigh. no active drainage. no signs of infection. healing.

## 2025-04-22 NOTE — HISTORY OF PRESENT ILLNESS
[Left Leg] : left leg [8] : 8 [Localized] : localized [de-identified] : Here for f/u left thigh. Feeling better. Finished antibiotics.  [] : no [FreeTextEntry6] : discomfort, some poss redness  from bandages [de-identified] : starts PT in 2 days

## 2025-04-22 NOTE — DISCUSSION/SUMMARY
[de-identified] : General Dx Discussion The patient was advised of the diagnosis. The natural history of the pathology was explained in full to the patient in layman's terms. All questions were answered. The risks and benefits of surgical and non-surgical treatment alternatives were explained in full to the patient.  Case Discussed. Doing better. f/u 2-3 weeks.   Entered by Linda DEMARCO acting as a scribe. Instructions: Dr. Johnson- The documentation recorded by the scribe accurately reflects the service I personally performed and the decisions made by me.

## 2025-05-15 NOTE — ASSESSMENT
[FreeTextEntry1] : Mary Parsons is a 54 y/o female with PMH of vasovagal syncope (s/p ILR), migraines, HLD, cervical spine fracture. Patient has had a longstanding history of fainting which begun when she was a teenager.  She underwent a loop recorder implantation in January 2024 ([facility]).   Since her implantation, she states she has not had any recurrent episodes of syncope.  Device prior interrogations reviewed, no evidence of adrian or tachyarrhythmia.   Device interrogated in the office today, again showing no adrian or tachyarrhythmias.  She has been otherwise doing well.  She feels it and it is bothersome with some movements. She would like a diagnosis for her syncope so she can have it removed.  The site is healed well with no swelling   recommend  follow up 6 months  call with question sooner if needed

## 2025-05-15 NOTE — HISTORY OF PRESENT ILLNESS
[FreeTextEntry1] : This is a 56 y/o female with PMH of vasovagal syncope (s/p ILR), migraines, HLD, cervical spine fracture. Patient has had a longstanding history of fainting which begun when she was a teenager.  She underwent a loop recorder implantation in January 2024 ([facility]).

## 2025-05-15 NOTE — CARDIOLOGY SUMMARY
[de-identified] : 05/15/25: SB 56 bpm, normal intervals 11/8/2024 - NSR 68BPM, normal intervals [de-identified] : 8/23/23 TTE: LVEF 61%, moderately increased wall thickness, normal RV function, mild TR

## 2025-05-27 NOTE — HISTORY OF PRESENT ILLNESS
[Left Leg] : left leg [Radiating] : radiating [Sleep] : sleep [Meds] : meds [Lying in bed] : lying in bed [de-identified] : Here for f/u left thigh. Going to PT. Feeling better, but still having some pain.  [] : no [de-identified] : sleeping on left side, back of hip

## 2025-05-27 NOTE — DISCUSSION/SUMMARY
[de-identified] : General Dx Discussion The patient was advised of the diagnosis. The natural history of the pathology was explained in full to the patient in layman's terms. All questions were answered. The risks and benefits of surgical and non-surgical treatment alternatives were explained in full to the patient.  Case Discussed. Doing better. Continue PT and HEP.  f/u 2 months.   Entered by Linda DEMARCO acting as a scribe. Instructions: Dr. Johnson- The documentation recorded by the scribe accurately reflects the service I personally performed and the decisions made by me.

## 2025-06-04 NOTE — HISTORY OF PRESENT ILLNESS
[Right Leg] : right leg [Work related] : work related [Sudden] : sudden [8] : 8 [Radiating] : radiating [Tightness] : tightness [Intermittent] : intermittent [Household chores] : household chores [Meds] : meds [Nothing helps with pain getting better] : Nothing helps with pain getting better [Sitting] : sitting [Standing] : standing [Walking] : walking [Exercising] : exercising [Disabled] : Work status: disabled [de-identified] : Patient Complaint - WC 3/10/2006 R Hip s/p arthroscopy (Dr. Ray) Better w/ ART therapy and going to the gym, hasn't been able to go recently due to her mother being i take Vicodin which is helpful. 7/14/10: Pt still notes pain in her buttocks, groin and greater trochanteric region. Had u/s guided injecti and buttock w/ early relief 11/10/10 Awaiting auth for repeat injection 1/5/11 had trigger injections x 2 w/ good releif. ART 3/2/11: pt presents with cont pain in R hip, no significant changes. Pt cont to use Vicodin. 5/11/11: Pt presents f/u R hip pain, still doing ART which is helpful. Pt still using Vicodin. 7/13/11 just had r glut injection and is feeling better, but still c/o pain. Getting ART. 10/19/11: cont to note pain, ART is still helpful. Vicodin PRN. 3/7/12: f/u R hip pain. Still awaiting authorization for R hip injection (via Dr. Ray). 6/6/12: f/u R hip, notes cont pain in lateral thigh and groin region, especially after working. Had injectio 8/8/12 f/u R hip ART post-poned but restarted. Has f/u w/ Dr. Ray and new MRI 10/3/12 cont P.T. considering hip injection 12/5/12: f/u R hip, completed MRI of R hip, notes cont pain. 3/13/13 f/u R hip. Had f/u w/ Dr. Ray, awaitting auth for injection. 5/8/13: f/u R hip, notes it gave out , causing her to stumble, no increase in pain. Doing ART still. 7/3/13: f/u R hip, notes cont pain. Doing ART, which is helpful. Requesting Vicodin. F/u Dr. Ray 8/21/13: f/u R hip. She continues to have pain. ART helps. 10/02/13: Pt here for f/u right hip. stabel, good and bad reynaga. doing ART and going to gym. requesting re 11/13/13 here for follow up , is scheduled to see Dr Rodríguez for consult, still going to gym and doing AR 1/15/14: here for f/u right hip. continued pain. waiting to see Dr. Rodríguez. doing ART with good relief. 3/12/14: F/u R hip. Doing ART 5/14/14 F/ R hi Di ART Vidi  f i Sh i till iti t  D Rt t HSS 8/20/14 f/u R hip w/clicking 10/1/14 f/u R hip scheduled first of 3 injections 10/10 ART Requ Vicodin r/n 12/10/14: f/u R hip. stable. better after injections. ART. requesting vicodin. 2/4/15 f/u R hip HEP. Caring for family member 4/1/15 f/u R hip hep, ART occas popping 5/27/15: f/u R hip lower back. Intermittent pain. ART with good relief. 7/8/15 f/u R hip PT reauthorized, still going to ART. 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 Had PT/ART yesterday, u patch 10/21/15: f/u R hip. Here to discuss mri results. Continued pain. 12/2/15 f/u R hip HEP. PT not authorized 2/24/16 f/u R hip HEP.. Was better w/ART, aqua 6/8/16 f/u R hip and radiating lbp. Has been caring for mother, unable to get to regular treatments Working l 8/3/16 f/u R hip. Increasing pain requesting injection. Working light duty 9/14/16 f/u R hip Back pain better after last injection ART, light duty 11/9/16: f/u R hip, continued lateral hip pain, doing ART/HEP and going to the gym frequently. Working light  1/25/17: f/u R hip. continued pain. Doing ART. stable with medication. 3/15/17 f/u R hip HEP/gym. Working light duty 5/31/17 f/u L hip/vback. PT/ART no authorized HEP Working light duty 7/5/17: f/u L hip/back. waiting on authorization for heating pad. 9/13/17 f/u R hip HEP/ART Not working Med r/n 12/13/17 f/u R hip/back. HEP/PT ART. Working light duty 1/24/18 f/u R hip/back. PT/ART working light duty. Med request 4/11/18: f/u R hip/back. Continued pain lower back, right hip radiating down right leg. working light duty. 5/30/18 f/u back ,leg Better after last injection 8/29/18: f/u hip and back. continued pain. has concussion and has bad headache. working light duty 11/14/18: f/u hip and back. continued pain. working light duty. 1/9/18 f/u R hip HEP working light duty 3/13/19 f/u R hip Still clicks Not working due to unrelated neck/arm condition Was better w/ ART 5/1/19 f/u R hip. PT/ART on hold due to elbow condition Not working 6/12/19: f/u R hip. PT/ART when she can afford to go. She now c/o left hip pain from an altered gait. 7/31/19: f/u R hip Unable to get to gym/ART Not working 9/25/19 f/u R hip, "pops" No active treatment due to other conditions Not working 11/6/19 f/u R HIP Flector patch helpful but not authorized. Pain mgt 12/18/19: f/u R hip. Continued pain. waiting for auth for flector patch. Pt did not get any relief from Lidod topicals. 2/12/20 f/u R hip PT not auth, HEP 5/6/20: f/u R hip, continued pain to the hip. HEP. Taking diclofenac/Vicodin prn. disabled 7/7/20 f/u R hip/back. Flector patch auth pending Requesting injection 9/23/2020: f/u R hip/back. Continue pain to her back radiating down her R leg. Waiting for auth for mri lum 10/14/20 f/u R hip and back pain 1/27/21: f/u R hip and back pain. Continued hip pain. 3/24/21 f/u R hip clicking and R sided SI pain. HEP Not working due to arm and neck condition 6/23/21 f/u R hip click and r sided lbp HEP Not working 8/25/21 f/u R sided lbp and R hip "clicking" HEP Not working 10/27/21 f/u R hip and R sided lbp HEP Not working (neck and elbow unrelated) 1/5/22 f/u R lbp, R hip radiating to groin 2/23/22 F/U r BACK/HIP RADIATING PAIN. lAST INJECTION HELPED GLUTEAL PAIN HEP/Gym Not working R 5/4/22  f/u lbp/R hip  Voltaren Gel/Vicodin  ? muscle relaxant  Not working 6/8/22  f/u R hip, knee, low back, and R ankle  HEP/Gym 8/3/22  f/u R hip R knee R ankle and low back.  HEP Requ Voltaren Gel and Vicodin  Not working 10/26/22  f/u R hip/knee/back/ankle  HEP/Gym  Med r/n 2/22/23: f/u R hip/knee/back/ankle.   hep/gym.   med r/n 3/29/23  f/u R hip/knee/back/ankle R buttock to groin radiation HEP  Pain MGT  Massage therapy 6/14/23  f/u R hip/back/knee/ankle  Pain Mgt  Gym program  Major issues with pain mgt 9/20/23  f/u R hip/back HEP  [place] request  Not working 12/13/23  f/u R hip/back  HEP/Gym beneficial  Not working 3/13/24  f/u R hip/back  Recent appendectomy  Restarted Gym 5/22/24  f/u R hip/back.  Radiating R hip/buttovk/groin pain Med auth requested (x2)  Gym helpful 7/24/24  f/u R hip/back  Med r/n  HEP/Gym  Not working 9/25/24  f/u R hip and back.  Having issues with Pain Rx timing 11/27/24: f/u R hip and back. better w/ gym   1/29/25  f/u R hip and lbp.  Better w/ gym and PT 3/26/25  f/u R hip and lumbar spine.  Home/Gym  PT auth pending  Requ Meds 6/4/25  f/u R hip LS  Gym helpful  Med r/n [] : no [FreeTextEntry1] : Rt Hip [FreeTextEntry3] : 3-10-06 [FreeTextEntry7] : lower back [de-identified] : over use [FreeTextEntry9] : gym exercises [de-identified] : not working

## 2025-06-04 NOTE — DISCUSSION/SUMMARY
[de-identified] : Continue HEP/Gym Request auth for PT  Discussed timing of med r/n Pain Management

## 2025-07-29 NOTE — DISCUSSION/SUMMARY
[de-identified] : General Dx Discussion The patient was advised of the diagnosis. The natural history of the pathology was explained in full to the patient in layman's terms. All questions were answered. The risks and benefits of surgical and non-surgical treatment alternatives were explained in full to the patient.  Case Discussed. Doing well is stable f/u prn

## 2025-07-29 NOTE — PHYSICAL EXAM
[Left] : left hip [NL (30)] : extension 30 degrees [NL (35)] : adduction 35 degrees [NL (45)] : internal rotation 45 degrees [] : no pain with hip rotation [5___] : extension 5[unfilled]/5 [FreeTextEntry3] : healing puncture wound healed  [TWNoteComboBox7] : flexion 110 degrees

## 2025-07-29 NOTE — HISTORY OF PRESENT ILLNESS
[Disabled] : Work status: disabled [de-identified] : f/u lt thigh  [0] : 0 [de-identified] : p/t, hep